# Patient Record
Sex: FEMALE | Race: WHITE | NOT HISPANIC OR LATINO | URBAN - NONMETROPOLITAN AREA
[De-identification: names, ages, dates, MRNs, and addresses within clinical notes are randomized per-mention and may not be internally consistent; named-entity substitution may affect disease eponyms.]

---

## 2020-12-21 LAB
CHLAMYDIA /N. GONORRHOEAE SCREEN: NEGATIVE
HPV APTIMA: NEGATIVE
PAP SMEAR: NORMAL
POC BETA-HCG (QUAL): POSITIVE

## 2021-01-19 LAB
GLUCOSE UR QL STRIP: NEGATIVE
LEUKOCYTE EST, POC UA: NEGATIVE
NITRITE, POC UA: NEGATIVE
PROTEIN, POC: NEGATIVE

## 2021-02-18 LAB
CLARITY, POC UA: NORMAL
COLOR, POC UA: YELLOW
GLUCOSE UR QL STRIP: NEGATIVE
LEUKOCYTE EST, POC UA: NEGATIVE
NITRITE, POC UA: NEGATIVE
PROTEIN, POC: NORMAL

## 2021-03-16 LAB
CLARITY, POC UA: CLEAR
COLOR, POC UA: YELLOW
GLUCOSE UR QL STRIP: NEGATIVE
LEUKOCYTE EST, POC UA: NEGATIVE
NITRITE, POC UA: NEGATIVE
PROTEIN, POC: NORMAL

## 2021-03-22 LAB
CLARITY, POC UA: CLEAR
COLOR, POC UA: YELLOW
GLUCOSE UR QL STRIP: NEGATIVE
LEUKOCYTE EST, POC UA: NEGATIVE
NITRITE, POC UA: NEGATIVE
PROTEIN, POC: NEGATIVE

## 2021-04-22 LAB
BILIRUB SERPL-MCNC: NEGATIVE MG/DL
BLOOD URINE, POC: NORMAL
CLARITY, POC UA: CLEAR
COLOR, POC UA: YELLOW
GLUCOSE UR QL STRIP: NEGATIVE
KETONES UR QL STRIP: NORMAL
LEUKOCYTE EST, POC UA: NEGATIVE
NITRITE, POC UA: NEGATIVE
PH, POC UA: 5.5
PROTEIN, POC: NEGATIVE
SPECIFIC GRAVITY, POC UA: 1.02
UROBILINOGEN, POC UA: NORMAL

## 2021-04-28 LAB
GLUCOSE 1H P 100 G GLC PO SERPL-MCNC: 202 MG/DL (ref 70–140)
RPR SER QL: NORMAL

## 2021-04-30 LAB — PROT 24H UR-MCNC: 160 G/DL (ref 1–149)

## 2021-07-05 LAB
BILIRUB SERPL-MCNC: NEGATIVE MG/DL
BLOOD URINE, POC: NORMAL
CLARITY, POC UA: NORMAL
COLOR, POC UA: YELLOW
GLUCOSE UR QL STRIP: NEGATIVE
KETONES UR QL STRIP: NEGATIVE
LEUKOCYTE EST, POC UA: NORMAL
NITRITE, POC UA: POSITIVE
PH, POC UA: 6
PROTEIN, POC: NORMAL
SPECIFIC GRAVITY, POC UA: 1.02
UROBILINOGEN, POC UA: NORMAL

## 2021-10-14 LAB
CHLAMYDIA /N. GONORRHOEAE SCREEN: NEGATIVE
HPV APTIMA: NEGATIVE
PAP SMEAR: NORMAL
TRICH VAG BY NAA: NEGATIVE

## 2022-01-04 LAB
BILIRUB SERPL-MCNC: NEGATIVE MG/DL
BLOOD URINE, POC: NORMAL
CLARITY, POC UA: CLEAR
COLOR, POC UA: YELLOW
GLUCOSE UR QL STRIP: NEGATIVE
KETONES UR QL STRIP: NEGATIVE
LEUKOCYTE EST, POC UA: NEGATIVE
NITRITE, POC UA: NEGATIVE
PH, POC UA: 5.5
PROTEIN, POC: NEGATIVE
SPECIFIC GRAVITY, POC UA: NORMAL
UROBILINOGEN, POC UA: NORMAL

## 2022-01-13 LAB
CBC, PLATELET CT, AND DIFF: NORMAL
FIBRINOGEN PPP-MCNC: 274 MG/DL
HIV 1+2 AB+HIV1P24 AG SERPLBLD IA.RAPID: NONREACTIVE
HIV-1 AND HIV-2 ANTIBODIES: NORMAL
INR PPP: 1
PT: 10.2
PTT: 25.6
URINALYSIS GROSS EXAM: NORMAL

## 2022-01-16 LAB
ABO AND RH: NORMAL
ANTIBODY SCREEN: NEGATIVE
HCG UR QL IA.RAPID: NEGATIVE

## 2022-01-17 LAB — RPR: NEGATIVE

## 2022-01-24 ENCOUNTER — HISTORICAL (OUTPATIENT)
Dept: ADMINISTRATIVE | Facility: HOSPITAL | Age: 47
End: 2022-01-24

## 2022-01-24 LAB
GLUCOSE, 1 HOUR: 189 MG/DL
GLUCOSE, 1/2 HOUR: 186
GLUCOSE, 2 HOUR: 121 MG/DL
GLUCOSE, FASTING: 99 MG/DL (ref 60–109)

## 2022-04-10 ENCOUNTER — HISTORICAL (OUTPATIENT)
Dept: ADMINISTRATIVE | Facility: HOSPITAL | Age: 47
End: 2022-04-10

## 2022-04-26 VITALS
SYSTOLIC BLOOD PRESSURE: 120 MMHG | HEIGHT: 62 IN | DIASTOLIC BLOOD PRESSURE: 82 MMHG | BODY MASS INDEX: 34.89 KG/M2 | WEIGHT: 189.63 LBS

## 2022-05-01 ENCOUNTER — HISTORICAL (OUTPATIENT)
Dept: ADMINISTRATIVE | Facility: HOSPITAL | Age: 47
End: 2022-05-01

## 2022-05-03 NOTE — HISTORICAL [facility] CERNER
This is a historical note converted from Abraham. Formatting and pictures may have been removed.  Please reference Abraham for original formatting and attached multimedia. Chief Complaint  new ob. lmp 10/12/2020. no complaints. needs pap  History of Present Illness  44 yo WF  at 10w0d by LMP here for new OB visit.? No c/o. H/o CHTN not currently on meds.  LMP/EGA/HARESH  Gestational Age (EGA) and HARESH?? ? * Note: EGA calculated as of 2020  ?  HARESH:?2021???EGA*:?10 weeks ? ? ? ? ? ?Type:?Authoritative???Method Date:?10/12/2020  ?  ?? ? ?Method:?Last Menstrual Period?(10/12/2020)  ?? ? ?Confirmation:?Confirmed  ?? ? ?Description:?--  ?? ? ?Comments:?--  ?? ? ?Entered by:?Vivi DURÁN, Everardo RODRIGUEZ on 2020?  ?  Other HARESH Calculations for this Pregnancy:  ?? ? ?No additional HARESH calculations have been recorded for this pregnancy  Gynecological History  Menstrual Status Intake: Other: pregnant  STIs/STDs: Yes  Abnormal Pap: No  Dyspareunia: No  Postcoital Bleeding: No  Dysuria: No  Additional GYN Information: last pap +5 year per patient  Breast CA Hx: No  Sexually Active: Yes  Review of Systems  General/Constitutional:  Fever?denies?.?  Skin:  Rash?denies.  Respiratory:  Cough?denies?. SOB?denies?. Wheezing?denies?.  Cardiovascular:  Chest pain?denies?. Dizziness?denies?.?Palpitations?denies?. Swelling in hands/feet?denies?.?  Gastrointestinal:  Abdominal pain?denies?. Constipation?denies?. Diarrhea?denies?. Heartburn?denies?. Nausea?denies?. Vomiting?denies?  Genitourinary:  Painful urination?denies.  Gynecologic:  Vaginal bleeding?denies?. Vaginal discharge?Normal. Leaking amniotic fluid?denies. ?Contractions?denies?  Psychiatric:  Depressed mood?denies. Suicidal thoughts?denies.?ob  Physical Exam  Vitals & Measurements  T:?36.3? ?C (Temporal Artery)? BP:?126/86?  HT:?162.00?cm? WT:?84.000?kg? BMI:?32.01?  Chaperone:?present.  ?  General appearance:?- healthy, well-nourished and  well-developed  ?  Psychiatric:?Orientation to time, place and person. Normal mood and affect and active, alert  ?  Skin:  Appearance:?no rashes or lesions.  ?  Neck:  Neck:?supple, FROM, trachea midline. and no masses  Thyroid:?no enlargement or nodules and non-tender.  ?  ?  Cardiovascular  Auscultation:?RRR and no murmur.  Peripheral Vascular:?no varicosities, LLE edema, RLE edema, calf tenderness, and palpable cord and pedal pulses intact.  ?  Lungs:  Respiratory effort:?no intercostal retractions or accessory muscle usage.  Auscultation:?no wheezing, rales/crackles, or rhonchi and clear to auscultation.  ?  Breast:  Inspection/Palpation:?no tenderness, discrete/distinct masses, skin changes, or abnormal secretions. Nipple appearance normal.  ?  Abdomen:  Auscultation/Inspection/Palpation:?no hepatomegaly, splenomegaly, masses , tenderness??or CVA tenderness and soft, non distended bowel sounds preset.?  Hernia:?no palpable hernias.  ?  Female Genitalia:  Vulva:?no masses,?tenderness or?lesions  Bladder/Urethra:?no urethral discharge or mass, normal meatus, bladder non-distended.  Vagina:?no tenderness,erythema, cystocele, rectocele, abnormal vaginal discharge,or vesicle(s) or ulcers  Cervix:?no discharge , no cervical lacerations noted or motion tenderness and grossly normal  Uterus:?normal size and shape and midline, non-tender, and no uterine prolapse.  Adnexa/Parametria:?no parametrial tenderness or mass, no adnexal tenderness or ovarian mass.  ?  Lymph Nodes:  Palpation:?non tender submandibular nodes, axillary nodes, or inguinal nodes.  ?  Rectal Exam:  Rectum:?normal perianal skin.  ?   TVUS:  ?- IUP w CRL: 10w4d c/w LMP  ?- FHT:?160  ?- HARESH: 7/19/21  ?- Nml ovaries  ?- no free fluid  Assessment/Plan  1.?Chronic hypertension in pregnancy?O10.919  Ordered:  Electrocardiogram Adult 12 Lead, 12/21/20 14:56:00 CST, 12/21/20 14:56:00 CST, -1, -1, 12/21/20 14:56:00 CST, Chronic hypertension in pregnancy  10  Constitutional: Well developed, well nourished. NAD. Comfortable. Interactive. Smiling. Cries with tears during examination but consolable. Nontoxic.  Head: Normocephalic, atraumatic. [place] flat.  Eyes: PERRL. EOMI.  ENT: + clear nasal discharge. TM's clear bilaterally with normal light reflex, + landmarks. Mucous membranes moist. No posterior pharyngeal erythema/exudates. Uvula midline.  Neck: Supple. Painless ROM.  Cardiovascular: Normal S1, S2. Regular rate and rhythm. No murmurs, rubs, or gallops.  Pulmonary: Normal respiratory rate and effort. Lungs clear to auscultation bilaterally. No wheezing, rales, or rhonchi.  Abdominal: Soft. Nondistended. Nontender. No rebound, guarding, rigidity.  : Normal external examination, no lesions, trauma.  Extremities. No lower extremity edema.  Skin: No rashes, cyanosis.  Neuro: Moves all extremities, appropriate developmentally for age. weeks gestation of pregnancy  AMA (advanced maternal age) multigravida 35+  Maternal obesity, antepartum, Outside facility  Protein 24 Hour Urine, Routine collect, Urine, Outside Lab Order, Collected, 12/21/20 14:54:00 CST, Stop date 12/21/20 14:54:00 CST, Nurse collect, Chronic hypertension in pregnancy  10 weeks gestation of pregnancy  AMA (advanced maternal age) multigravida 35+  Maternal...  ?  2.?10 weeks gestation of pregnancy?Z3A.10  Ordered:  Electrocardiogram Adult 12 Lead, 12/21/20 14:56:00 CST, 12/21/20 14:56:00 CST, -1, -1, 12/21/20 14:56:00 CST, Chronic hypertension in pregnancy  10 weeks gestation of pregnancy  AMA (advanced maternal age) multigravida 35+  Maternal obesity, antepartum, Outside facility  Protein 24 Hour Urine, Routine collect, Urine, Outside Lab Order, Collected, 12/21/20 14:54:00 CST, Stop date 12/21/20 14:54:00 CST, Nurse collect, Chronic hypertension in pregnancy  10 weeks gestation of pregnancy  AMA (advanced maternal age) multigravida 35+  Maternal...  ?  3.?AMA (advanced maternal age) multigravida 35+?O09.529  Ordered:  Antibody Screen for transfusion (Indirect Shan), Routine collect, *Est. 12/22/20 3:00:00 CST, Blood, Order for future visit, Outside Lab (Non [facility]), *Est. Stop date 12/22/20 3:00:00 CST, Lab Collect, AMA (advanced maternal age) multigravida 35+  Maternal obesity, antepartum, 12/22/20 3:00:00 CST  CBC w/ Auto Diff, Routine collect, *Est. 12/22/20 3:00:00 CST, Blood, Order for future visit, Outside Lab (Non [facility]), *Est. Stop date 12/22/20 3:00:00 CST, Lab Collect, AMA (advanced maternal age) multigravida 35+  Maternal obesity, antepartum, 12/22/20 3:00:00 CST  Chlam trachom & N gonorrhoeae by [facility] [id number], Routine collect, Urine, Order for future visit, Outside Lab Order, Collected, *Est. 12/28/20 3:00:00 CST, *Est. Stop date 12/28/20 3:00:00 CST, Nurse collect, AMA (advanced maternal age) multigravida 35+  Maternal obesity, antepartum,  12/28/20 3:00:0...  Clinic Follow up, *Est. 01/18/21 3:00:00 CST, RTC 4 weeks, Order for future visit, AMA (advanced maternal age) multigravida 35+  Maternal obesity, antepartum, SAUL SHELDON  Comprehensive Metabolic Panel, Routine collect, 12/21/20 14:53:00 CST, Blood, Order for future visit, Outside Lab (Non [facility]), Stop date 12/21/20 14:53:00 CST, Lab Collect, AMA (advanced maternal age) multigravida 35+  Maternal obesity, antepartum, 12/21/20 14:53:00 CST  Electrocardiogram Adult 12 Lead, 12/21/20 14:56:00 CST, 12/21/20 14:56:00 CST, -1, -1, 12/21/20 14:56:00 CST, Chronic hypertension in pregnancy  10 weeks gestation of pregnancy  AMA (advanced maternal age) multigravida 35+  Maternal obesity, antepartum, Outside facility  Free T4, Routine collect, 12/21/20 14:53:00 CST, Blood, Order for future visit, Outside Lab (Non [facility]), Stop date 12/21/20 14:53:00 CST, Lab Collect, AMA (advanced maternal age) multigravida 35+  Maternal obesity, antepartum, 12/21/20 14:53:00 CST  Hemoglobin A1c, Routine collect, 12/21/20 14:53:00 CST, Blood, Order for future visit, Outside Lab (Non [facility]), Stop date 12/21/20 14:53:00 CST, Lab Collect, AMA (advanced maternal age) multigravida 35+  Maternal obesity, antepartum, 12/21/20 14:53:00 CST  Hepatitis B Surface Antigen, Routine collect, *Est. 12/22/20 3:00:00 CST, Blood, Order for future visit, Outside Lab (Non [facility]), *Est. Stop date 12/22/20 3:00:00 CST, Lab Collect, AMA (advanced maternal age) multigravida 35+  Maternal obesity, antepartum, 12/22/20 3:00:00 CST  Hepatitis C Antibody, Routine collect, *Est. 12/28/20 3:00:00 CST, Blood, Order for future visit, Outside Lab (Non [facility]), *Est. Stop date 12/28/20 3:00:00 CST, Lab Collect, AMA (advanced maternal age) multigravida 35+  Maternal obesity, antepartum, 12/28/20 3:00:00 CST  HIV 1 and 2, Routine collect, *Est. 12/28/20 3:00:00 CST, Blood, Order for future visit, Outside Lab (Non [facility]), *Est. Stop  date 12/28/20 3:00:00 CST, Lab Collect, AMA (advanced maternal age) multigravida 35+  Maternal obesity, antepartum, 12/28/20 3:00:00 CST  Office/Outpatient Visit Level 4 [place] [id number] PC, AMA (advanced maternal age) multigravida 35+  Maternal obesity, antepartum, [facility], 12/21/20 14:52:00 CST  Protein 24 Hour Urine, Routine collect, Urine, Outside Lab Order, Collected, 12/21/20 14:54:00 CST, Stop date 12/21/20 14:54:00 CST, Nurse collect, Chronic hypertension in pregnancy  10 weeks gestation of pregnancy  AMA (advanced maternal age) multigravida 35+  Maternal...  Rubella Antibody IgG, Routine collect, *Est. 12/22/20 3:00:00 CST, Blood, Order for future visit, Outside Lab (Non [facility]), *Est. Stop date 12/22/20 3:00:00 CST, Lab Collect, AMA (advanced maternal age) multigravida 35+  Maternal obesity, antepartum, 12/22/20 3:00:00 CST  Sickle Cell Solubility Test, Routine collect, *Est. 12/28/20 3:00:00 CST, Blood, Order for future visit, Outside Lab (Non [facility]), *Est. Stop date 12/28/20 3:00:00 CST, Lab Collect, AMA (advanced maternal age) multigravida 35+  Maternal obesity, antepartum, 12/28/20 3:00:00 CST  Syphilis Antibody (with Reflex RPR), Routine collect, *Est. 12/22/20 3:00:00 CST, Blood, Order for future visit, Outside Lab (Non [facility]), Collected, *Est. Stop date 12/22/20 3:00:00 CST, Lab Collect, AMA (advanced maternal age) multigravida 35+  Maternal obesity, antepartum, 12/22/20...  Thyroid Stimulating Hormone, Routine collect, *Est. 12/28/20 3:00:00 CST, Blood, Order for future visit, Outside Lab (Non [facility]), *Est. Stop date 12/28/20 3:00:00 CST, Lab Collect, AMA (advanced maternal age) multigravida 35+  Maternal obesity, antepartum, 12/28/20 3:00:00 CST  Type and Rh, Routine collect, *Est. 12/22/20 3:00:00 CST, Blood, Order for future visit, Outside Lab (Non [facility]), *Est. Stop date 12/22/20 3:00:00 CST, Lab Collect, AMA (advanced maternal age) multigravida 35+  Maternal obesity,  antepartum, 12/22/20 3:00:00 CST  Urinalysis Dip POC [id number] PC, 12/21/20 14:53:00 CST, [facility]  Urine Culture [id number], Routine collect, *Est. 12/28/20 3:00:00 CST, Order for future visit, Outside Lab Order?, Urine, Clean Catch, Collected, Nurse collect, *Est. Stop date 12/28/20 3:00:00 CST, AMA (advanced maternal age) multigravida 35+  Maternal obesity, antepartum  US NonRad OB Transvaginal, Routine, 12/21/20 14:52:00 CST, Other (please specify), None, Ambulatory, Rad Type, AMA (advanced maternal age) multigravida 35+  Maternal obesity, antepartum, Outside Facility, 12/21/20 14:52:00 CST  Varicella Zoster Virus IgG Ab-[facility] [id number], Routine collect, *Est. 12/28/20 3:00:00 CST, Blood, Order for future visit, Outside Lab (Non [facility]), *Est. Stop date 12/28/20 3:00:00 CST, Lab Collect, AMA (advanced maternal age) multigravida 35+  Maternal obesity, antepartum, 12/28/20 3:00:00 CST  ?  4.?Maternal obesity, antepartum?O99.210  Ordered:  Antibody Screen for transfusion (Indirect Shan), Routine collect, *Est. 12/22/20 3:00:00 CST, Blood, Order for future visit, Outside Lab (Non [facility]), *Est. Stop date 12/22/20 3:00:00 CST, Lab Collect, AMA (advanced maternal age) multigravida 35+  Maternal obesity, antepartum, 12/22/20 3:00:00 CST  CBC w/ Auto Diff, Routine collect, *Est. 12/22/20 3:00:00 CST, Blood, Order for future visit, Outside Lab (Non [facility]), *Est. Stop date 12/22/20 3:00:00 CST, Lab Collect, AMA (advanced maternal age) multigravida 35+  Maternal obesity, antepartum, 12/22/20 3:00:00 CST  Chlam trachom & N gonorrhoeae by [facility] [id number], Routine collect, Urine, Order for future visit, Outside Lab Order, Collected, *Est. 12/28/20 3:00:00 CST, *Est. Stop date 12/28/20 3:00:00 CST, Nurse collect, ALBERTO (advanced maternal age) multigravida 35+  Maternal obesity, antepartum, 12/28/20 3:00:0...  Clinic Follow up, *Est. 01/18/21 3:00:00 CST, RTC 4 weeks, Order for future visit, ALBERTO (advanced maternal  age) multigravida 35+  Maternal obesity, antepartum, MARYANAGAYATHRIFRANKIE BeaulieuLovenanda SHELDON  Comprehensive Metabolic Panel, Routine collect, 12/21/20 14:53:00 CST, Blood, Order for future visit, Outside Lab (Non [facility]), Stop date 12/21/20 14:53:00 CST, Lab Collect, AMA (advanced maternal age) multigravida 35+  Maternal obesity, antepartum, 12/21/20 14:53:00 CST  Electrocardiogram Adult 12 Lead, 12/21/20 14:56:00 CST, 12/21/20 14:56:00 CST, -1, -1, 12/21/20 14:56:00 CST, Chronic hypertension in pregnancy  10 weeks gestation of pregnancy  AMA (advanced maternal age) multigravida 35+  Maternal obesity, antepartum, Outside facility  Free T4, Routine collect, 12/21/20 14:53:00 CST, Blood, Order for future visit, Outside Lab (Non [facility]), Stop date 12/21/20 14:53:00 CST, Lab Collect, AMA (advanced maternal age) multigravida 35+  Maternal obesity, antepartum, 12/21/20 14:53:00 CST  Hemoglobin A1c, Routine collect, 12/21/20 14:53:00 CST, Blood, Order for future visit, Outside Lab (Non [facility]), Stop date 12/21/20 14:53:00 CST, Lab Collect, AMA (advanced maternal age) multigravida 35+  Maternal obesity, antepartum, 12/21/20 14:53:00 CST  Hepatitis B Surface Antigen, Routine collect, *Est. 12/22/20 3:00:00 CST, Blood, Order for future visit, Outside Lab (Non [facility]), *Est. Stop date 12/22/20 3:00:00 CST, Lab Collect, AMA (advanced maternal age) multigravida 35+  Maternal obesity, antepartum, 12/22/20 3:00:00 CST  Hepatitis C Antibody, Routine collect, *Est. 12/28/20 3:00:00 CST, Blood, Order for future visit, Outside Lab (Non [facility]), *Est. Stop date 12/28/20 3:00:00 CST, Lab Collect, AMA (advanced maternal age) multigravida 35+  Maternal obesity, antepartum, 12/28/20 3:00:00 CST  HIV 1 and 2, Routine collect, *Est. 12/28/20 3:00:00 CST, Blood, Order for future visit, Outside Lab (Non [facility]), *Est. Stop date 12/28/20 3:00:00 CST, Lab Collect, AMA (advanced maternal age) multigravida 35+  Maternal obesity, antepartum, 12/28/20  3:00:00 CST  Office/Outpatient Visit Level 4 [facility] [id number] PC, AMA (advanced maternal age) multigravida 35+  Maternal obesity, antepartum, MARYANAGAYATHRIFRANKIE [facility], 12/21/20 14:52:00 CST  Protein 24 Hour Urine, Routine collect, Urine, Outside Lab Order, Collected, 12/21/20 14:54:00 CST, Stop date 12/21/20 14:54:00 CST, Nurse collect, Chronic hypertension in pregnancy  10 weeks gestation of pregnancy  AMA (advanced maternal age) multigravida 35+  Maternal...  Rubella Antibody IgG, Routine collect, *Est. 12/22/20 3:00:00 CST, Blood, Order for future visit, Outside Lab (Non [facility]), *Est. Stop date 12/22/20 3:00:00 CST, Lab Collect, AMA (advanced maternal age) multigravida 35+  Maternal obesity, antepartum, 12/22/20 3:00:00 CST  Sickle Cell Solubility Test, Routine collect, *Est. 12/28/20 3:00:00 CST, Blood, Order for future visit, Outside Lab (Non [facility]), *Est. Stop date 12/28/20 3:00:00 CST, Lab Collect, AMA (advanced maternal age) multigravida 35+  Maternal obesity, antepartum, 12/28/20 3:00:00 CST  Syphilis Antibody (with Reflex RPR), Routine collect, *Est. 12/22/20 3:00:00 CST, Blood, Order for future visit, Outside Lab (Non [facility]), Collected, *Est. Stop date 12/22/20 3:00:00 CST, Lab Collect, AMA (advanced maternal age) multigravida 35+  Maternal obesity, antepartum, 12/22/20...  Thyroid Stimulating Hormone, Routine collect, *Est. 12/28/20 3:00:00 CST, Blood, Order for future visit, Outside Lab (Non [facility]), *Est. Stop date 12/28/20 3:00:00 CST, Lab Collect, AMA (advanced maternal age) multigravida 35+  Maternal obesity, antepartum, 12/28/20 3:00:00 CST  Type and Rh, Routine collect, *Est. 12/22/20 3:00:00 CST, Blood, Order for future visit, Outside Lab (Non [facility]), *Est. Stop date 12/22/20 3:00:00 CST, Lab Collect, AMA (advanced maternal age) multigravida 35+  Maternal obesity, antepartum, 12/22/20 3:00:00 CST  Urinalysis Dip POC [id number] PC, 12/21/20 14:53:00 CST, SAUL SHELDON  Urine Culture [id number],  Constitutional: Well developed, well nourished. NAD. Comfortable. Interactive. Smiling. Cries with tears during examination but consolable. Nontoxic.  Head: Normocephalic, atraumatic. [place] flat.  Eyes: PERRL. EOMI.  ENT: + clear nasal discharge. TM's erythematous bilaterally with normal light reflex, + landmarks. Mucous membranes moist. No posterior pharyngeal erythema/exudates. Uvula midline.  Neck: Supple. Painless ROM.  Cardiovascular: Normal S1, S2. Regular rate and rhythm. No murmurs, rubs, or gallops.  Pulmonary: Normal respiratory rate and effort. Lungs clear to auscultation bilaterally. No wheezing, rales, or rhonchi.  Abdominal: Soft. Nondistended. Nontender. No rebound, guarding, rigidity.  : Normal external examination, no lesions, trauma.  Extremities. No lower extremity edema.  Skin: + viral exanthem.  Neuro: Moves all extremities, appropriate developmentally for age. Routine collect, *Est. 20 3:00:00 CST, Order for future visit, Outside Lab Order?, Urine, Clean Catch, Collected, Nurse collect, *Est. Stop date 20 3:00:00 CST, AMA (advanced maternal age) multigravida 35+  Maternal obesity, antepartum  US NonRad OB Transvaginal, Routine, 20 14:52:00 CST, Other (please specify), None, Ambulatory, Rad Type, AMA (advanced maternal age) multigravida 35+  Maternal obesity, antepartum, Outside Facility, 20 14:52:00 CST  Varicella Zoster Virus IgG Ab-[facility] [id number], Routine collect, *Est. 20 3:00:00 CST, Blood, Order for future visit, Outside Lab (Non [facility]), *Est. Stop date 20 3:00:00 CST, Lab Collect, AMA (advanced maternal age) multigravida 35+  Maternal obesity, antepartum, 20 3:00:00 CST  ?  Prenatal counseling  Discussed AMA and increased risks of chromosome abnormalities and birth defects.? Offered cfDNA testing, pt would like to be tested  Discussed CHTN and associated risks.? Begin ASA 80mg daily at 12 weeks.  referral to MFM at 16-20wks  Discussed appropriate weight gain for pregnancy  Tobacco avoidance/cessation  Illicit drug avoidance  PNL, EKG, 24hr urine protein, Hba1c, TSH  PNV  Pap  GC/CZ  RTC 4 weeks.  Referrals  Clinic Follow up, *Est. 21 3:00:00 CST, RTC 4 weeks, Order for future visit, AMA (advanced maternal age) multigravida 35+  Maternal obesity, antepartum, SAUL SHELDON   OB History  Pregnancy History???(1,0,0,1)?? ??  Pregnancy # 1  Baby 1?????????????Outcome Date:? ??? Outcome:?Live Birth  ???Outcome or Result:?Vaginal  ???Gender:?--????????Gest Age:?Fullterm ??????Wt:?--  ???Hospital:?--????????[facility] Labor:?--  ???Jose Daniel Name:?--?????Babys Father:?--  Problem List/Past Medical History  Ongoing  AMA (advanced maternal age) multigravida 35+  Chronic hypertension in pregnancy  Maternal obesity, antepartum  Pregnant  Pregnant  Historical  Pregnant  Medications  No active  medications  Allergies  No active allergies  Social History  Alcohol  Never, 12/21/2020  Sexual  Sexually active: Yes. Number of current partners 1. Sexual orientation: Straight or heterosexual., 12/21/2020  Tobacco  Never (less than 100 in lifetime), N/A, 12/21/2020  Health Maintenance  Health Maintenance  ???Pending?(in the next year)  ??? ??OverDue  ??? ? ? ?Alcohol Misuse Screening due??01/02/20??and every 1??year(s)  ??? ??Due?  ??? ? ? ?ADL Screening due??12/21/20??and every 1??year(s)  ??? ? ? ?Hypertension Maintenance-Medication Prescribed due??12/21/20??and every 1??year(s)  ??? ? ? ?Hypertension Management-Education due??12/21/20??and every 1??year(s)  ??? ? ? ?Tetanus Vaccine due??12/21/20??and every 10??year(s)  ??? ??Due In Future?  ??? ? ? ?Obesity Screening not due until??01/01/21??and every 1??year(s)  ???Satisfied?(in the past 1 year)  ??? ??Satisfied?  ??? ? ? ?Blood Pressure Screening on??12/21/20.??Satisfied by Sabrina Butts  ??? ? ? ?Body Mass Index Check on??12/21/20.??Satisfied by Sabrina Butts  ??? ? ? ?Hypertension Management-Blood Pressure on??12/21/20.??Satisfied by Sabrina Butts  ??? ? ? ?Obesity Screening on??12/21/20.??Satisfied by Sabrina Butts  ?

## 2022-05-09 ENCOUNTER — HISTORICAL (OUTPATIENT)
Dept: ADMINISTRATIVE | Facility: HOSPITAL | Age: 47
End: 2022-05-09

## 2022-09-18 ENCOUNTER — HISTORICAL (OUTPATIENT)
Dept: ADMINISTRATIVE | Facility: HOSPITAL | Age: 47
End: 2022-09-18

## 2023-01-05 VITALS
SYSTOLIC BLOOD PRESSURE: 128 MMHG | BODY MASS INDEX: 33.12 KG/M2 | DIASTOLIC BLOOD PRESSURE: 76 MMHG | WEIGHT: 194 LBS | HEIGHT: 64 IN

## 2023-01-07 PROBLEM — F32.A DEPRESSIVE DISORDER: Status: ACTIVE | Noted: 2023-01-07

## 2023-01-07 PROBLEM — Z86.32 HISTORY OF GESTATIONAL DIABETES MELLITUS: Status: ACTIVE | Noted: 2023-01-07

## 2023-01-07 PROBLEM — I10 HYPERTENSION: Status: ACTIVE | Noted: 2023-01-07

## 2023-01-07 PROBLEM — N93.9 ABNORMAL UTERINE BLEEDING: Status: ACTIVE | Noted: 2023-01-07

## 2023-01-07 RX ORDER — LABETALOL 100 MG/1
100 TABLET, FILM COATED ORAL 2 TIMES DAILY
COMMUNITY

## 2023-01-07 RX ORDER — BUPROPION HYDROCHLORIDE 150 MG/1
150 TABLET, EXTENDED RELEASE ORAL 2 TIMES DAILY
COMMUNITY
End: 2023-07-26

## 2023-01-07 RX ORDER — ASPIRIN 81 MG/1
81 TABLET ORAL DAILY
COMMUNITY

## 2023-01-07 RX ORDER — MEDROXYPROGESTERONE ACETATE 10 MG/1
10 TABLET ORAL DAILY
COMMUNITY
Start: 2022-05-18 | End: 2023-05-26

## 2023-01-07 RX ORDER — LISINOPRIL AND HYDROCHLOROTHIAZIDE 20; 25 MG/1; MG/1
1 TABLET ORAL 2 TIMES DAILY
COMMUNITY

## 2023-05-01 ENCOUNTER — TELEPHONE (OUTPATIENT)
Dept: OBSTETRICS AND GYNECOLOGY | Facility: CLINIC | Age: 48
End: 2023-05-01
Payer: MEDICAID

## 2023-05-01 NOTE — TELEPHONE ENCOUNTER
Questions regarding if she received vaccines while pregnant due to child being pregnant at this time. Reviewed immunizations with pt, questions answered, voiced understanding.

## 2023-05-01 NOTE — TELEPHONE ENCOUNTER
----- Message from Delores Tello sent at 5/1/2023 10:53 AM CDT -----  Regarding: Call Back  Type:  Patient Returning Call    Who Called:Pt  Who Left Message for Patient:  Does the patient know what this is regarding?:  Would the patient rather a call back or a response via [facility]? Call Back  Best Call Back Number:[phone]  Additional Information: Pt has questions about vaccinations while pregnant because daughter is pregnant.

## 2023-05-26 DIAGNOSIS — N85.00 ENDOMETRIAL HYPERPLASIA, UNSPECIFIED: ICD-10-CM

## 2023-05-26 RX ORDER — MEDROXYPROGESTERONE ACETATE 10 MG/1
TABLET ORAL
Qty: 30 TABLET | Refills: 11 | Status: SHIPPED | OUTPATIENT
Start: 2023-05-26

## 2023-07-26 DIAGNOSIS — Z86.32 PERSONAL HISTORY OF GESTATIONAL DIABETES: ICD-10-CM

## 2023-07-26 DIAGNOSIS — Z01.411 ENCOUNTER FOR GYNECOLOGICAL EXAMINATION (GENERAL) (ROUTINE) WITH ABNORMAL FINDINGS: ICD-10-CM

## 2023-07-26 RX ORDER — BUPROPION HYDROCHLORIDE 150 MG/1
TABLET, EXTENDED RELEASE ORAL
Qty: 60 TABLET | Refills: 2 | Status: SHIPPED | OUTPATIENT
Start: 2023-07-26

## 2023-07-27 ENCOUNTER — TELEPHONE (OUTPATIENT)
Dept: OBSTETRICS AND GYNECOLOGY | Facility: CLINIC | Age: 48
End: 2023-07-27
Payer: MEDICAID

## 2023-12-13 ENCOUNTER — OUTSIDE PLACE OF SERVICE (OUTPATIENT)
Dept: PULMONOLOGY | Facility: CLINIC | Age: 48
End: 2023-12-13
Payer: MEDICAID

## 2023-12-15 PROCEDURE — 95811 PR POLYSOMNOGRAPHY W/CPAP: ICD-10-PCS | Mod: 26,,, | Performed by: INTERNAL MEDICINE

## 2023-12-15 PROCEDURE — 95811 POLYSOM 6/>YRS CPAP 4/> PARM: CPT | Mod: 26,,, | Performed by: INTERNAL MEDICINE

## 2024-04-26 ENCOUNTER — HOSPITAL ENCOUNTER (OUTPATIENT)
Dept: RADIOLOGY | Facility: HOSPITAL | Age: 49
Discharge: HOME | End: 2024-04-26
Payer: MEDICAID

## 2024-04-26 DIAGNOSIS — Z12.31 SCREENING MAMMOGRAM, ENCOUNTER FOR: ICD-10-CM

## 2024-04-26 PROCEDURE — 77063 BREAST TOMOSYNTHESIS BI: CPT | Mod: TC

## 2024-06-10 ENCOUNTER — HOSPITAL ENCOUNTER (OUTPATIENT)
Dept: RADIOLOGY | Facility: HOSPITAL | Age: 49
Discharge: HOME | End: 2024-06-10
Attending: FAMILY MEDICINE
Payer: MEDICAID

## 2024-06-10 DIAGNOSIS — R51.9 FACIAL PAIN: ICD-10-CM

## 2024-06-10 PROCEDURE — 70551 MRI BRAIN STEM W/O DYE: CPT | Mod: TC

## 2024-06-11 ENCOUNTER — HOSPITAL ENCOUNTER (OUTPATIENT)
Dept: PREADMISSION TESTING | Facility: HOSPITAL | Age: 49
Discharge: HOME | End: 2024-06-11
Attending: FAMILY MEDICINE
Payer: MEDICAID

## 2024-06-11 VITALS — HEIGHT: 64 IN | WEIGHT: 171 LBS | BODY MASS INDEX: 29.19 KG/M2

## 2024-06-11 DIAGNOSIS — Z12.11 SCREENING FOR COLON CANCER: Primary | ICD-10-CM

## 2024-06-11 RX ORDER — AMLODIPINE BESYLATE 10 MG/1
TABLET ORAL
COMMUNITY

## 2024-06-11 RX ORDER — LOSARTAN POTASSIUM AND HYDROCHLOROTHIAZIDE 25; 100 MG/1; MG/1
1 TABLET ORAL
COMMUNITY
Start: 2024-06-03

## 2024-06-11 NOTE — DISCHARGE INSTRUCTIONS

## 2024-06-12 ENCOUNTER — ANESTHESIA EVENT (OUTPATIENT)
Dept: GASTROENTEROLOGY | Facility: HOSPITAL | Age: 49
End: 2024-06-12
Payer: MEDICAID

## 2024-06-12 NOTE — ANESTHESIA PREPROCEDURE EVALUATION
06/12/2024  Gail Duval is a 49 y.o., female.    Anesthesia History    No specialty history recorded    Other Medical History   Depression History of gestational diabetes mellitus (GDM)   Hypertension Herpes simplex virus (HSV) infection   Pre-diabetes      Surgical History    TUBAL LIGATION HYSTEROSCOPY WITH DILATION AND CURETTAGE OF UTERUS   ENDOMETRIAL ABLATION ENDOMETRIAL BIOPSY     COVID-19 Risk of Complications  Current as of about an hour ago    1 0 to < 3 Points: Low Risk   3 to < 6 Points: Medium Risk   6 to 16 Points: High Risk     No Change      Details   Substance History    Smoking Status: Never   Passive Exposure: Never   Smokeless Tobacco Status: Never   Alcohol use: Never   Drug use: Never     Anesthesia Family History    Problem Relations (Age of Onset)   No history of this type found      Current Gender Identity    Questions Responses   Patient's Gender Identity: Female   Patient's sex assigned at birth: Female        Pre-op Assessment    I have reviewed the Patient Summary Reports.     I have reviewed the Nursing Notes. I have reviewed the NPO Status.   I have reviewed the Medications.     Review of Systems  Anesthesia Hx:  No problems with previous Anesthesia             Denies Family Hx of Anesthesia complications.    Denies Personal Hx of Anesthesia complications.                    Social:  Non-Smoker       Hematology/Oncology:  Hematology Normal   Oncology Normal                                   EENT/Dental:  EENT/Dental Normal           Cardiovascular:  Exercise tolerance: poor   Hypertension                                        Pulmonary:        Sleep Apnea           Education provided regarding risk of obstructive sleep apnea            Renal/:  Renal/ Normal                 Hepatic/GI:  Hepatic/GI Normal                 Musculoskeletal:  Musculoskeletal Normal                 OB/GYN/PEDS:  HSV           Neurological:  Neurology Normal                                      Endocrine:  Endocrine Normal            Dermatological:  Skin Normal    Psych:  Psychiatric History  depression              Physical Exam  General: Well nourished, Cooperative, Alert and Oriented    Airway:  Mallampati: II / II  Mouth Opening: Normal  TM Distance: Normal  Tongue: Normal  Neck ROM: Normal ROM    Dental:  Loose teeth  Poor dentition multiple carries      Anesthesia Plan  Type of Anesthesia, risks & benefits discussed:    Anesthesia Type: MAC  Intra-op Monitoring Plan: Standard ASA Monitors  Post Op Pain Control Plan: multimodal analgesia  Induction:  IV  Airway Plan: Direct  Informed Consent: Informed consent signed with the Patient and all parties understand the risks and agree with anesthesia plan.  All questions answered. Patient consented to blood products? Yes  ASA Score: 2  Day of Surgery Review of History & Physical: H&P Update referred to the surgeon/provider.I have interviewed and examined the patient. I have reviewed the patient's H&P dated: There are no significant changes.     Ready For Surgery From Anesthesia Perspective.     .

## 2024-06-14 ENCOUNTER — HOSPITAL ENCOUNTER (OUTPATIENT)
Dept: GASTROENTEROLOGY | Facility: HOSPITAL | Age: 49
Discharge: HOME | End: 2024-06-14
Attending: FAMILY MEDICINE
Payer: MEDICAID

## 2024-06-14 ENCOUNTER — ANESTHESIA (OUTPATIENT)
Dept: GASTROENTEROLOGY | Facility: HOSPITAL | Age: 49
End: 2024-06-14
Payer: MEDICAID

## 2024-06-14 DIAGNOSIS — Z12.11 SCREENING FOR COLON CANCER: ICD-10-CM

## 2024-06-14 DIAGNOSIS — Z12.11 COLON CANCER SCREENING: ICD-10-CM

## 2024-06-14 LAB
B-HCG UR QL: NEGATIVE
POCT GLUCOSE: 97 MG/DL (ref 70–110)

## 2024-06-14 PROCEDURE — 25000003 PHARM REV CODE 250: Performed by: FAMILY MEDICINE

## 2024-06-14 PROCEDURE — S5010 5% DEXTROSE AND 0.45% SALINE: HCPCS | Performed by: FAMILY MEDICINE

## 2024-06-14 PROCEDURE — 37000009 HC ANESTHESIA EA ADD 15 MINS

## 2024-06-14 PROCEDURE — 81025 URINE PREGNANCY TEST: CPT | Performed by: FAMILY MEDICINE

## 2024-06-14 PROCEDURE — D9220A PRA ANESTHESIA: Mod: ,,, | Performed by: NURSE ANESTHETIST, CERTIFIED REGISTERED

## 2024-06-14 PROCEDURE — 63600175 PHARM REV CODE 636 W HCPCS: Performed by: NURSE ANESTHETIST, CERTIFIED REGISTERED

## 2024-06-14 PROCEDURE — G0121 COLON CA SCRN NOT HI RSK IND: HCPCS | Performed by: FAMILY MEDICINE

## 2024-06-14 PROCEDURE — 25000003 PHARM REV CODE 250: Performed by: NURSE ANESTHETIST, CERTIFIED REGISTERED

## 2024-06-14 PROCEDURE — 37000008 HC ANESTHESIA 1ST 15 MINUTES

## 2024-06-14 RX ORDER — PROPOFOL 10 MG/ML
VIAL (ML) INTRAVENOUS
Status: DISCONTINUED | OUTPATIENT
Start: 2024-06-14 | End: 2024-06-14

## 2024-06-14 RX ORDER — DEXTROSE MONOHYDRATE AND SODIUM CHLORIDE 5; .45 G/100ML; G/100ML
INJECTION, SOLUTION INTRAVENOUS CONTINUOUS
Status: DISCONTINUED | OUTPATIENT
Start: 2024-06-14 | End: 2024-06-15 | Stop reason: HOSPADM

## 2024-06-14 RX ORDER — LIDOCAINE HYDROCHLORIDE 20 MG/ML
INJECTION INTRAVENOUS
Status: DISCONTINUED | OUTPATIENT
Start: 2024-06-14 | End: 2024-06-14

## 2024-06-14 RX ADMIN — PROPOFOL 110 MG: 10 INJECTION, EMULSION INTRAVENOUS at 09:06

## 2024-06-14 RX ADMIN — LIDOCAINE HYDROCHLORIDE 100 MG: 20 INJECTION, SOLUTION INTRAVENOUS at 09:06

## 2024-06-14 RX ADMIN — DEXTROSE AND SODIUM CHLORIDE: 5; 450 INJECTION, SOLUTION INTRAVENOUS at 07:06

## 2024-06-14 NOTE — ANESTHESIA POSTPROCEDURE EVALUATION
Anesthesia Post Evaluation    Patient: Gail Duval    Procedure(s) Performed: * No procedures listed *    Final Anesthesia Type: MAC      Patient location during evaluation: floor  Patient participation: Yes- Able to Participate  Level of consciousness: awake and alert, awake and oriented  Post-procedure vital signs: reviewed and stable  Pain management: adequate  Airway patency: patent    PONV status at discharge: No PONV  Anesthetic complications: no      Cardiovascular status: blood pressure returned to baseline  Respiratory status: unassisted, room air and spontaneous ventilation  Hydration status: euvolemic  Follow-up not needed.              Vitals Value Taken Time   /66 06/14/24 0945   Temp 98 06/14/24 0945   Pulse 77 06/14/24 0945   Resp 18 06/14/24 0945   SpO2 99 06/14/24 0945         No case tracking events are documented in the log.      Pain/Charlotte Score: No data recorded

## 2024-06-14 NOTE — DISCHARGE SUMMARY
[facility]  [facility]charge [facility]  Short Stay    Colonoscopy      OUTCOME: Patient tolerated treatment/procedure well without complication and is now ready for discharge.    DISPOSITION: Home or Self Care    FINAL DIAGNOSIS:  <principal problem not specified>    FOLLOWUP: In clinic    DISCHARGE INSTRUCTIONS:  No discharge procedures on file.     TIME SPENT ON DISCHARGE:  minutes

## 2024-06-14 NOTE — PLAN OF CARE
Discharge instructions reviewed with patient/family, copy given.  Discharged per wheelchair/ambulatory to car accompanied by family.

## 2024-06-14 NOTE — PLAN OF CARE
Returned to room per stretcher. Awake, alert. No complaints. Passing gas, iced water given and tolerating well.

## 2024-06-17 VITALS
WEIGHT: 171 LBS | OXYGEN SATURATION: 98 % | RESPIRATION RATE: 20 BRPM | TEMPERATURE: 97 F | HEIGHT: 64 IN | BODY MASS INDEX: 29.19 KG/M2 | DIASTOLIC BLOOD PRESSURE: 71 MMHG | HEART RATE: 85 BPM | SYSTOLIC BLOOD PRESSURE: 99 MMHG

## 2024-07-03 NOTE — PROGRESS NOTES
Chief Complaint:   Well Woman     History of Present Illness:  Gail Duval is a 49 y.o. year old  presents for her well woman exam. Currently relying on BTL for birth control. No cycle with ablation, s/p 2022. She is without complaints today.       Gyn History:  Menstrual History   Cycle: No  Menarche Age: 13 years  No Cycle Reason: (!) Surgical  Surgical Reason: ablation  [place]  Sexually Active: No  STI History: Yes  STI Type: Chlamydia  Contraception: Yes  Contraception Type: Tubal ligation/salpingectony  Menopause  Menopause Age: 2 years  Breast History  Last Breast Imaging Date: Yes  Date: 24 (negative)  History of Abnormal Breast Imaging : No  History of Breast Biopsy: No  Pap History   Last pap date: 10/14/21  Result: Normal  History of Abnormal Pap: No  HPV Vaccine Completed: No      Review of Systems:  General/Constitutional: Chills denies. Fatigue/weakness denies. Fever denies. Night sweats denies. Hot flashes denies    Respiratory: Cough denies. Hemoptysis denies. SOB denies. Sputum production denies. Wheezing denies .   Cardiovascular: Chest pain denies. Dizziness denies. Palpitations denies. Swelling in hands/feet denies.                Breast: Dimpling denies. Breast mass denies. Breast pain/tenderness denies. Nipple discharge denies. Puckering denies.  Gastrointestinal: Abdominal pain denies. Blood in stool denies. Constipation denies. Diarrhea denies. Heartburn denies. Nausea denies. Vomiting denies    Genitourinary: Incontinence denies. Blood in urine denies. Frequent urination denies. Painful urination denies. Urinary urgency denies. Nocturia denies    Gynecologic: Irregular menses denies. Heavy bleeding denies. Painful menses denies. Vaginal discharge denies. Vaginal odor denies. Vaginal itching denies. Vaginal lesion denies. Pelvic pain denies. Decreased libido denies. Vulvar lesion denies. Prolapse of genital organs denies. Painful intercourse denies. Postcoital  bleeding denies    Psychiatric: Depression denies. Anxiety denies.     OB History    Para Term  AB Living   2 2 2 0 0 2   SAB IAB Ectopic Multiple Live Births   0 0 0 0 2      # 1 - Date: 96, Sex: Female, Weight: 3.345 kg (7 lb 6 oz), GA: 40w0d, Type: Vaginal, Spontaneous, Apgar1: None, Apgar5: None, Living: Living, Birth Comments: None    # 2 - Date: 21, Sex: Female, Weight: 2.807 kg (6 lb 3 oz), GA: 37w0d, Type: Vaginal, Spontaneous, Apgar1: None, Apgar5: None, Living: Living, Birth Comments: None      Past Medical History:   Diagnosis Date    Depression     Herpes simplex virus (HSV) infection     HSV 2    History of gestational diabetes mellitus (GDM)     Hypertension     Pre-diabetes        Current Outpatient Medications:     amLODIPine (NORVASC) 10 MG tablet, , Disp: , Rfl:     buPROPion (WELLBUTRIN SR) 150 MG TBSR 12 hr tablet, TAKE ONE TABLET BY MOUTH TWICE DAILY, Disp: 60 tablet, Rfl: 2    losartan-hydrochlorothiazide 100-25 mg (HYZAAR) 100-25 mg per tablet, Take 1 tablet by mouth., Disp: , Rfl:     medroxyPROGESTERone (PROVERA) 10 MG tablet, TAKE ONE TABLET BY MOUTH DAILY, Disp: 30 tablet, Rfl: 11    Review of patient's allergies indicates:  No Known Allergies    Past Surgical History:   Procedure Laterality Date    COLONOSCOPY  2024    ENDOMETRIAL ABLATION  2022    Dr. Everardo Trinidad    ENDOMETRIAL BIOPSY  09/15/2022    In-office procedure by Dr. Everardo Trinidad    HYSTEROSCOPY WITH DILATION AND CURETTAGE OF UTERUS  2022    Dr. Everardo Trinidad    TUBAL LIGATION  2021    Dr. Everardo Trinidad     Family History   Problem Relation Name Age of Onset    Diabetes Maternal Grandmother Clare Lo     Diabetes Father Esteban Duval     No Known Problems Mother      Ovarian cancer Paternal Aunt Juanis Escobar     Colon cancer Paternal Aunt          60s    Breast cancer Neg Hx      Uterine cancer Neg Hx      Cervical cancer Neg Hx       Social History     Socioeconomic History     "Marital status: Single   Tobacco Use    Smoking status: Never     Passive exposure: Never    Smokeless tobacco: Never   Substance and Sexual Activity    Alcohol use: Not Currently    Drug use: Never    Sexual activity: Not Currently     Partners: Male     Birth control/protection: Abstinence, See Surgical Hx     Comment: hx of Chlamydia @ age 20       Physical Exam:  /82 (BP Location: Right arm, Patient Position: Sitting, BP Method: Medium (Manual))   Temp 97.7 °F (36.5 °C) (Temporal)   Ht 5' 4" (1.626 m)   Wt 81.9 kg (180 lb 9.6 oz)   BMI 31.00 kg/m²     Chaperone: present.       General appearance: healthy, well-nourished and well-developed     Psychiatric: Orientation to time, place and person. Normal mood and affect and active, alert     Skin: Appearance: no rashes or lesions.     Neck:   Neck: supple, FROM, trachea midline. and no masses   Thyroid: no enlargement or nodules and non-tender.       Cardiovascular:   Auscultation: RRR and no murmur.   Peripheral Vascular: no varicosities, LLE edema, RLE edema, calf tenderness, and palpable cord and pedal pulses intact.     Lungs:   Respiratory effort: no intercostal retractions or accessory muscle usage.   Auscultation: no wheezing, rales/crackles, or rhonchi and clear to auscultation.     Breast:   Inspection/Palpation: no tenderness, discrete/distinct masses, skin changes, or abnormal secretions. Nipple appearance normal.     Abdomen:   Auscultation/Inspection/Palpation: no hepatomegaly, splenomegaly, masses, tenderness or CVA tenderness and soft, non-distended bowel sounds preset.    Hernia: no palpable hernias.     Female Genitalia:    Vulva: no masses, tenderness or lesions    Bladder/Urethra: no urethral discharge or mass, normal meatus, bladder non-distended.    Vagina: no tenderness, erythema, cystocele, rectocele, abnormal vaginal discharge or vesicle(s) or ulcers    Cervix: no discharge, no cervical lacerations noted or motion tenderness and " grossly normal    Uterus: normal size and shape and midline, non-tender, and no uterine prolapse.    Adnexa/Parametria: no parametrial tenderness or mass, no adnexal tenderness or ovarian mass.     Lymph Nodes:   Palpation: non tender submandibular nodes, axillary nodes, or inguinal nodes.     Rectal Exam:   Rectum: normal perianal skin.       Assessment/Plan:  1. Encounter for well woman exam with abnormal findings  Pap   Recommend BSE monthly   Discussed recommendations of annual screening after age of 40 with mammogram  Explained that screening is not 100% reliable. Advised patient if she notices any changes to her breast including a lump, mass, dimpling, discharge, rash, or tenderness she should contact us immediately.     Healthy diet, exercise   MMG  Multivitamin   Seat belt   Sunscreen use   Safe sex/ STI education  Contraception: BTL  Annual labs: w/ PCP, Dr. Hunt   C-scope: 6/14/24    2. BMI 31.0-31.9,adult  Discussed with patient weight loss with healthy diet and exercise modifications     RTC annual         This note was transcribed by Tasha Lopez MA. There may be transcription errors as a result, however minimal. Effort has been made to ensure accuracy of transcription, but any obvious errors or omissions should be clarified with the author of the document.

## 2024-07-05 ENCOUNTER — OFFICE VISIT (OUTPATIENT)
Dept: OBSTETRICS AND GYNECOLOGY | Facility: CLINIC | Age: 49
End: 2024-07-05
Payer: MEDICAID

## 2024-07-05 ENCOUNTER — HOSPITAL ENCOUNTER (OUTPATIENT)
Dept: RADIOLOGY | Facility: HOSPITAL | Age: 49
Discharge: HOME | End: 2024-07-05
Attending: NURSE PRACTITIONER
Payer: MEDICAID

## 2024-07-05 VITALS
TEMPERATURE: 98 F | HEIGHT: 64 IN | WEIGHT: 180.63 LBS | SYSTOLIC BLOOD PRESSURE: 138 MMHG | DIASTOLIC BLOOD PRESSURE: 82 MMHG | BODY MASS INDEX: 30.84 KG/M2

## 2024-07-05 DIAGNOSIS — Z12.4 SCREENING FOR MALIGNANT NEOPLASM OF THE CERVIX: Primary | ICD-10-CM

## 2024-07-05 DIAGNOSIS — Z01.411 ENCOUNTER FOR WELL WOMAN EXAM WITH ABNORMAL FINDINGS: ICD-10-CM

## 2024-07-05 DIAGNOSIS — E78.5 HYPERLIPEMIA: ICD-10-CM

## 2024-07-05 PROBLEM — N93.9 ABNORMAL UTERINE BLEEDING: Status: RESOLVED | Noted: 2023-01-07 | Resolved: 2024-07-05

## 2024-07-05 PROCEDURE — 87624 HPV HI-RISK TYP POOLED RSLT: CPT | Performed by: OBSTETRICS & GYNECOLOGY

## 2024-07-05 PROCEDURE — 75571 CT HRT W/O DYE W/CA TEST: CPT | Mod: TC

## 2024-10-18 ENCOUNTER — LAB VISIT (OUTPATIENT)
Dept: LAB | Facility: HOSPITAL | Age: 49
End: 2024-10-18
Attending: INTERNAL MEDICINE
Payer: MEDICAID

## 2024-10-18 DIAGNOSIS — E78.5 HYPERLIPIDEMIA, UNSPECIFIED HYPERLIPIDEMIA TYPE: Primary | ICD-10-CM

## 2024-10-18 DIAGNOSIS — I10 ESSENTIAL HYPERTENSION, MALIGNANT: ICD-10-CM

## 2024-10-18 LAB
CHOLEST SERPL-MCNC: 234 MG/DL (ref 0–200)
HDLC SERPL-MCNC: 54 MG/DL (ref 40–60)
LDLC SERPL DIRECT ASSAY-SCNC: 122.6 MG/DL (ref 30–100)
TRIGL SERPL-MCNC: 181 MG/DL (ref 30–200)

## 2024-10-18 PROCEDURE — 80061 LIPID PANEL: CPT

## 2024-10-18 PROCEDURE — 36415 COLL VENOUS BLD VENIPUNCTURE: CPT

## 2025-07-15 DIAGNOSIS — R90.89 ABNORMAL COMPUTERIZED TOMOGRAPHY OF BRAIN: Primary | ICD-10-CM

## 2025-07-22 ENCOUNTER — HOSPITAL ENCOUNTER (OUTPATIENT)
Dept: RADIOLOGY | Facility: HOSPITAL | Age: 50
Discharge: HOME | End: 2025-07-22
Attending: FAMILY MEDICINE
Payer: MEDICAID

## 2025-07-22 ENCOUNTER — HOSPITAL ENCOUNTER (EMERGENCY)
Facility: HOSPITAL | Age: 50
Discharge: HOME | End: 2025-07-22
Payer: MEDICAID

## 2025-07-22 VITALS
HEART RATE: 97 BPM | BODY MASS INDEX: 34.68 KG/M2 | DIASTOLIC BLOOD PRESSURE: 71 MMHG | WEIGHT: 203.13 LBS | HEIGHT: 64 IN | TEMPERATURE: 99 F | RESPIRATION RATE: 18 BRPM | OXYGEN SATURATION: 95 % | SYSTOLIC BLOOD PRESSURE: 118 MMHG

## 2025-07-22 DIAGNOSIS — R90.89 ABNORMAL COMPUTERIZED TOMOGRAPHY OF BRAIN: ICD-10-CM

## 2025-07-22 DIAGNOSIS — R10.31 RIGHT LOWER QUADRANT ABDOMINAL PAIN: Primary | ICD-10-CM

## 2025-07-22 DIAGNOSIS — N30.90 CYSTITIS: ICD-10-CM

## 2025-07-22 LAB
B-HCG UR QL: NEGATIVE
BACTERIA #/AREA URNS AUTO: ABNORMAL /HPF
BILIRUB UR QL STRIP.AUTO: ABNORMAL
CLARITY UR: CLEAR
COLOR UR AUTO: ABNORMAL
GLUCOSE UR QL STRIP: 250
HGB UR QL STRIP: ABNORMAL
KETONES UR QL STRIP: NEGATIVE
LEUKOCYTE ESTERASE UR QL STRIP: ABNORMAL
NITRITE UR QL STRIP: POSITIVE
PH UR STRIP: 5 [PH]
PROT UR QL STRIP: 30
RBC #/AREA URNS AUTO: ABNORMAL /HPF
SP GR UR STRIP.AUTO: 1.02 (ref 1–1.03)
SQUAMOUS #/AREA URNS AUTO: ABNORMAL /HPF
UROBILINOGEN UR STRIP-ACNC: 4
WBC #/AREA URNS AUTO: ABNORMAL /HPF

## 2025-07-22 PROCEDURE — 99284 EMERGENCY DEPT VISIT MOD MDM: CPT | Mod: 25

## 2025-07-22 PROCEDURE — 25000003 PHARM REV CODE 250

## 2025-07-22 PROCEDURE — 70553 MRI BRAIN STEM W/O & W/DYE: CPT | Mod: TC

## 2025-07-22 PROCEDURE — A9577 INJ MULTIHANCE: HCPCS | Performed by: FAMILY MEDICINE

## 2025-07-22 PROCEDURE — 81003 URINALYSIS AUTO W/O SCOPE: CPT

## 2025-07-22 PROCEDURE — 81025 URINE PREGNANCY TEST: CPT

## 2025-07-22 PROCEDURE — 25500020 PHARM REV CODE 255: Performed by: FAMILY MEDICINE

## 2025-07-22 PROCEDURE — 81015 MICROSCOPIC EXAM OF URINE: CPT | Mod: XB

## 2025-07-22 RX ORDER — IBUPROFEN 600 MG/1
600 TABLET, FILM COATED ORAL
Status: COMPLETED | OUTPATIENT
Start: 2025-07-22 | End: 2025-07-22

## 2025-07-22 RX ORDER — NITROFURANTOIN 25; 75 MG/1; MG/1
100 CAPSULE ORAL
Status: COMPLETED | OUTPATIENT
Start: 2025-07-22 | End: 2025-07-22

## 2025-07-22 RX ORDER — NITROFURANTOIN 25; 75 MG/1; MG/1
100 CAPSULE ORAL 2 TIMES DAILY
Qty: 10 CAPSULE | Refills: 0 | Status: SHIPPED | OUTPATIENT
Start: 2025-07-22 | End: 2025-07-27

## 2025-07-22 RX ORDER — NAPROXEN 500 MG/1
500 TABLET ORAL 2 TIMES DAILY
Qty: 20 TABLET | Refills: 0 | Status: SHIPPED | OUTPATIENT
Start: 2025-07-22

## 2025-07-22 RX ORDER — POLYETHYLENE GLYCOL 3350 17 G/17G
17 POWDER, FOR SOLUTION ORAL DAILY
Qty: 510 G | Refills: 0 | Status: SHIPPED | OUTPATIENT
Start: 2025-07-22 | End: 2025-08-21

## 2025-07-22 RX ADMIN — NITROFURANTOIN MONOHYDRATE/MACROCRYSTALLINE 100 MG: 25; 75 CAPSULE ORAL at 08:07

## 2025-07-22 RX ADMIN — GADOBENATE DIMEGLUMINE 15 ML: 529 INJECTION, SOLUTION INTRAVENOUS at 09:07

## 2025-07-22 RX ADMIN — IBUPROFEN 600 MG: 600 TABLET ORAL at 08:07

## 2025-07-22 NOTE — Clinical Note
"Gail"Alejandra Duval was seen and treated in our emergency department on 7/22/2025.  She may return to work on 07/24/2025.       If you have any questions or concerns, please don't hesitate to call.      Tahir Olsen MD"

## 2025-07-23 NOTE — DISCHARGE INSTRUCTIONS
Take the antibiotics as prescribed.  Take the naproxen and Tylenol as needed for pain.  Take the MiraLax every day from now on.  Call your doctor tomorrow for follow up.

## 2025-07-23 NOTE — ED PROVIDER NOTES
Encounter Date: 7/22/2025       History     Chief Complaint   Patient presents with    Abdominal Pain     Reports that she has been ahving rlq and right groin pain for the past few days that is just getting worse. Currently taking azo     50-year-old female presents complaining of intermittent sharp right lower quadrant pains since yesterday.  They are getting worse.  It does not really hurt to move.  There has been no fever or chills.    The history is provided by the patient.     Review of patient's allergies indicates:  No Known Allergies  Past Medical History:   Diagnosis Date    Depression     Herpes simplex virus (HSV) infection     HSV 2    History of gestational diabetes mellitus (GDM)     Hypertension     Pre-diabetes      Past Surgical History:   Procedure Laterality Date    COLONOSCOPY  06/14/2024    ENDOMETRIAL ABLATION  01/17/2022    Dr. Everardo Trinidad    ENDOMETRIAL BIOPSY  09/15/2022    In-office procedure by Dr. Everardo Trinidad    HYSTEROSCOPY WITH DILATION AND CURETTAGE OF UTERUS  01/17/2022    Dr. Everardo Trinidad    TUBAL LIGATION  06/29/2021    Dr. Everardo Trinidad     Family History   Problem Relation Name Age of Onset    Diabetes Maternal Grandmother Clare Lo     Diabetes Father Esteban Duval     No Known Problems Mother      Ovarian cancer Paternal Aunt Juanis Escobar     Colon cancer Paternal Aunt          60s    Breast cancer Neg Hx      Uterine cancer Neg Hx      Cervical cancer Neg Hx       Social History[1]  Review of Systems   Constitutional:  Negative for fever.   HENT:  Negative for sore throat.    Respiratory:  Negative for shortness of breath.    Cardiovascular:  Negative for chest pain.   Gastrointestinal:  Positive for abdominal pain. Negative for nausea.   Genitourinary:  Negative for dysuria.   Musculoskeletal:  Negative for back pain.   Skin:  Negative for rash.   Neurological:  Negative for weakness.   Hematological:  Does not bruise/bleed easily.   All other systems reviewed and are  negative.      Physical Exam     Initial Vitals [07/22/25 1924]   BP Pulse Resp Temp SpO2   (!) 152/102 98 14 98.7 °F (37.1 °C) 95 %      MAP       --         Physical Exam    Nursing note and vitals reviewed.  Constitutional: Vital signs are normal. She appears well-developed and well-nourished. She is cooperative.   HENT:   Head: Normocephalic and atraumatic. Mouth/Throat: Oropharynx is clear and moist.   Eyes: Conjunctivae, EOM and lids are normal. Pupils are equal, round, and reactive to light.   Neck: Trachea normal. Neck supple.   Normal range of motion.  Cardiovascular:  Normal rate, regular rhythm, normal heart sounds and intact distal pulses.           Pulmonary/Chest: Breath sounds normal.   Abdominal: Abdomen is soft. Bowel sounds are normal. There is abdominal tenderness.   There is a bit of right lower quadrant tenderness   Musculoskeletal:         General: Normal range of motion.      Cervical back: Normal, normal range of motion and neck supple.      Lumbar back: Normal.     Neurological: She is alert and oriented to person, place, and time. She has normal strength. Coordination normal. GCS score is 15. GCS eye subscore is 4. GCS verbal subscore is 5. GCS motor subscore is 6.   Skin: Skin is warm, dry and intact. Capillary refill takes less than 2 seconds.   Psychiatric: She has a normal mood and affect. Her speech is normal and behavior is normal. Judgment and thought content normal. Cognition and memory are normal.         ED Course   Procedures  Labs Reviewed   URINALYSIS, REFLEX TO URINE CULTURE - Abnormal       Result Value    Color, UA Orange (*)     Appearance, UA Clear      Specific Gravity, UA 1.025      pH, UA 5.0      Protein, UA 30 (*)     Glucose,  (*)     Ketones, UA Negative      Blood, UA Small (*)     Bilirubin, UA Moderate (*)     Urobilinogen, UA 4.0 (*)     Nitrites, UA Positive (*)     Leukocyte Esterase, UA Trace (*)     Narrative:      URINE STABILITY IS 2 HOURS AT ROOM  TEMP OR    SIX HOURS REFRIGERATED. PERFORMING TESTING ON    SPECIMENS GREATER THAN THIS AGE MAY AFFECT THE    FOLLOWING TESTS:    PH          SPECIFIC GRAVITY           BLOOD    CLARITY     BILIRUBIN               UROBILINOGEN   URINALYSIS, MICROSCOPIC - Abnormal    Bacteria, UA Few (*)     RBC, UA 0-2      WBC, UA 3-5      Squamous Epithelial Cells, UA Moderate (*)    HCG QUALITATIVE URINE - Normal    hCG Qualitative, Urine Negative            Imaging Results              CT Abdomen Pelvis  Without Contrast (Final result)  Result time 07/22/25 20:43:44      Final result by Edmond Saez MD (07/22/25 20:43:44)                   Impression:        1. There is mild thickening of the lower esophageal wall and I suspect the possibility of a small, sliding-type hiatal hernia or at least gastroesophageal reflux and I cannot exclude mild thickening of the gastric wall which can be seen with chronic gastritis.  The appendix appears normal.    n/a    CATEGORY: n/a    The following dose reduction techniques are used for all CT at [facility]:    1.   Automated exposure control.    2.   Adjustment of the mA and/or kV according to patient size.    3.   Use of iterative reconstruction technique.      Electronically signed by: Edmond Saez  Date:    07/22/2025  Time:    20:43               Narrative:    EXAMINATION:  CT ABDOMEN PELVIS WITHOUT CONTRAST    CLINICAL HISTORY:  RLQ abdominal pain (Age >= 14y);    TECHNIQUE:  Low dose axial images, sagittal and coronal reformations were obtained from the lung bases to the pubic symphysis.  Oral contrast was not administered.    COMPARISON:  05/09/2022    FINDINGS:  Liver:  No clinically significant abnormalities noted.    Gallbladder/Biliary System:  No clinically significant abnormalities noted.    Spleen:  No clinically significant abnormalities noted.    Adrenal glands:  No clinically significant abnormalities noted.    Pancreas:  No clinically significant  abnormalities noted.    Kidneys/Urinary Tract:  No clinically significant abnormalities noted.    Urinary bladder:  No clinically significant abnormalities noted.    Prostate gland/uterus and ovaries:  No clinically significant abnormalities noted.    GI tract:  There is mild thickening of the lower esophageal wall and I suspect the possibility of a small, sliding-type hiatal hernia or at least gastroesophageal reflux and I cannot exclude mild thickening of the gastric wall which can be seen with chronic gastritis.  The appendix appears normal.    Vascular structures:  No clinically significant abnormalities noted.    Musculoskeletal structures:  No clinically significant abnormalities noted.    Miscellaneous:  No clinically significant abnormalities noted.                                       Medications   nitrofurantoin (macrocrystal-monohydrate) 100 MG capsule 100 mg (has no administration in time range)   ibuprofen tablet 600 mg (has no administration in time range)     Medical Decision Making  Right lower quadrant pains, some right lower quadrant tenderness  Differential diagnosis:  Appendicitis, ureterolithiasis, UTI, diverticulitis, ovarian cyst, pelvic mass, constipation  Urinalysis, CT    Amount and/or Complexity of Data Reviewed  Radiology: ordered. Decision-making details documented in ED Course.  Discussion of management or test interpretation with external provider(s): Urine is pretty normal, but shows possibly a very mild infection.  CT scan rules out appendicitis, ureterolithiasis, or large ovarian cyst.  Ovarian torsion is extremely unlikely without a significant cyst.  Likely diagnosis is constipation versus musculoskeletal pain.  Patient also has a standing diagnosis of interstitial cystitis.               ED Course as of 07/22/25 2050   Tue Jul 22, 2025 2047 CT Abdomen Pelvis  Without Contrast  Normal appendix.  No evidence of ureterolithiasis.  No masses.  Ovary appears normal. [TM]      ED  Course User Index  [TM] Tahir Olsen MD                               Clinical Impression:  Final diagnoses:  [R10.31] Right lower quadrant abdominal pain (Primary)  [N30.90] Cystitis          ED Disposition Condition    Discharge Good          ED Prescriptions       Medication Sig Dispense Start Date End Date Auth. Provider    naproxen (NAPROSYN) 500 MG tablet Take 1 tablet (500 mg total) by mouth 2 (two) times daily. 20 tablet 7/22/2025 -- Tahir Olsen MD    nitrofurantoin, macrocrystal-monohydrate, (MACROBID) 100 MG capsule Take 1 capsule (100 mg total) by mouth 2 (two) times daily. for 5 days 10 capsule 7/22/2025 7/27/2025 Tahir Olsen MD    polyethylene glycol (GLYCOLAX) 17 gram/dose powder Take 17 g by mouth once daily. 510 g 7/22/2025 8/21/2025 Tahir Olsen MD          Follow-up Information       Follow up With Specialties Details Why Contact Info    Dennis Hunt III, MD Family Medicine Call in 1 day  [address] [place] [address]  [phone]                     [1]   Social History  Tobacco Use    Smoking status: Never     Passive exposure: Never    Smokeless tobacco: Never   Substance Use Topics    Alcohol use: Not Currently    Drug use: Never        Tahir Olsen MD  07/22/25 2050